# Patient Record
(demographics unavailable — no encounter records)

---

## 2024-11-29 NOTE — PHYSICAL EXAM
[Normal] : no acute distress, well nourished, well developed and well-appearing [No Respiratory Distress] : no respiratory distress  [No Accessory Muscle Use] : no accessory muscle use [No Focal Deficits] : no focal deficits [Normal Affect] : the affect was normal [Normal Insight/Judgement] : insight and judgment were intact [de-identified] : On 2L NC [73090 - High Complexity requires an extensive number of possible diagnoses and/or the management options, extensive complexity of the medical data (tests, etc.) to be reviewed, and a high risk of significant complications, morbidity, and/or mortality as w] : High Complexity

## 2024-11-29 NOTE — ASSESSMENT
[FreeTextEntry1] : 77y/o M PMHX of A-fib on Eliquis, CAD s/p CROW to OM1 (1/2024), HTN. HLD, ILD, former smoker, hx of bleeding ulcers, P/W SOB, worsening cough and blood-tinged sputum for 3 days. Patient admitted for acute hypoxic respiratory failure 2/2 PNA vs ILD, treated with Zosyn/azithromycin, switch to Ceftin for 3 more days. - s/p bipap qhs. BCx, and legionella negative, mycoplasma PCR and MRSA swab negative. Pulmonary recommended IV steroids initially, then switched to PO Prednisone 40mg daily, O2 support and bronchodilators. CTA neg for PE w/ extensive GGO. Also had hyponatremia, with Na improved. Poor po intake, encouraged to hydrate, s/p IVF, echo with preserved function. Patient improved respiratory status, however requiring 2L NC home O2 for discharge. Maintain O2 sat 88-93%. Steroid taper as follows per pulm: prednisone 30mg PO qd x 3 days then decrease to 20mg PO qd and continue.   Upon presentation, is doing well overall, currently denies cough, SOB, wheezing, hemoptysis, fever, chills, chest tightness, hoarseness, change in voice, or rhinorrhea.  With no known history of childhood asthma, eczema, frequent URI, history of PNA, or intubation. With a 40year, 1ppd smoking history, quit 5 years ago.  -All medications reviewed and reconciled with patient, use as prescribed. -Continue use of oxygen at all times with monitoring of oxygen saturations -Scheduled F/U with Pulmonologist, Cardiology, PCP in 1-2 weeks. With visiting RN and home PT services. -Advised to call office immediately with any change or worsening of symptoms -Advised to call 911 of go to ER immediately with any S/S of acute respiratory distress or worsening of symptoms -Reviewed F/U CT scan in 6-8 weeks, resolution of PNA

## 2024-11-29 NOTE — HISTORY OF PRESENT ILLNESS
[Home] : at home, [unfilled] , at the time of the visit. [Medical Office: ([facility])___] : at the medical office located in  [Verbal consent obtained from patient] : the patient, [unfilled] [[place]] : Post-hospitalization from [facility] [ILD (interstitial lung disease)] : ILD (interstitial lung disease) [Pneumonia] : Pneumonia [Respiratory failure with hypoxia] : Respiratory failure with hypoxia [Yes] : Yes [Admitted on: ___] : The patient was admitted on [unfilled] [Discharged on ___] : discharged on [unfilled] [Discharge Summary] : discharge summary [Pertinent Labs] : pertinent labs [Radiology Findings] : radiology findings [Discharge Med List] : discharge medication list [Med Reconciliation] : medication reconciliation has been completed [Patient Contacted By: ____] : and contacted by [unfilled] [Discharged with Oxygen] : Discharged with oxygen [Discharged with Nursing Homecare] : Discharged with nursing homecare [FreeTextEntry2] :  Patient presents via telehealth for posthospitalization visit.  Patient is a 77y/o M PMHX of A-fib on Eliquis, CAD s/p CROW to OM1 (1/2024), HTN. HLD, ILD, former smoker, hx of bleeding ulcers, P/W SOB, worsening cough and blood-tinged sputum for 3 days. Patient admitted for acute hypoxic respiratory failure 2/2 PNA vs ILD, treated with Zosyn/azithromycin, switch to Ceftin for 3 more days. - s/p bipap qhs. BCx, and legionella negative, mycoplasma PCR and MRSA swab negative. Pulmonary recommended IV steroids initially, then switched to PO Prednisone 40mg daily, O2 support and bronchodilators. CTA neg for PE w/ extensive GGO. Also had hyponatremia, with Na improved. Poor po intake, encouraged to hydrate, s/p IVF, echo with preserved function. Patient improved respiratory status, however requiring 2L NC home O2 for discharge. Maintain O2 sat 88-93%. Steroid taper as follows per pulm: prednisone 30mg PO qd x 3 days then decrease to 20mg PO qd and continue.   Upon presentation, is doing well overall, currently denies cough, SOB, wheezing, hemoptysis, fever, chills, chest tightness, hoarseness, change in voice, or rhinorrhea.  With no known history of childhood asthma, eczema, frequent URI, history of PNA, or intubation. With a 40year, 1ppd smoking history, quit 5 years ago.

## 2024-11-29 NOTE — PHYSICAL EXAM
[Normal] : no acute distress, well nourished, well developed and well-appearing [No Respiratory Distress] : no respiratory distress  [No Accessory Muscle Use] : no accessory muscle use [No Focal Deficits] : no focal deficits [Normal Affect] : the affect was normal [Normal Insight/Judgement] : insight and judgment were intact [de-identified] : On 2L NC [29234 - High Complexity requires an extensive number of possible diagnoses and/or the management options, extensive complexity of the medical data (tests, etc.) to be reviewed, and a high risk of significant complications, morbidity, and/or mortality as w] : High Complexity

## 2024-11-29 NOTE — HISTORY OF PRESENT ILLNESS
[Home] : at home, [unfilled] , at the time of the visit. [Medical Office: ([facility])___] : at the medical office located in  [Verbal consent obtained from patient] : the patient, [unfilled] [[place]] : Post-hospitalization from [facility] [ILD (interstitial lung disease)] : ILD (interstitial lung disease) [Pneumonia] : Pneumonia [Respiratory failure with hypoxia] : Respiratory failure with hypoxia [Yes] : Yes [Admitted on: ___] : The patient was admitted on [unfilled] [Discharged on ___] : discharged on [unfilled] [Discharge Summary] : discharge summary [Pertinent Labs] : pertinent labs [Radiology Findings] : radiology findings [Discharge Med List] : discharge medication list [Med Reconciliation] : medication reconciliation has been completed [Patient Contacted By: ____] : and contacted by [unfilled] [Discharged with Oxygen] : Discharged with oxygen [Discharged with Nursing Homecare] : Discharged with nursing homecare [FreeTextEntry2] :  Patient presents via telehealth for posthospitalization visit.  Patient is a 79y/o M PMHX of A-fib on Eliquis, CAD s/p CROW to OM1 (1/2024), HTN. HLD, ILD, former smoker, hx of bleeding ulcers, P/W SOB, worsening cough and blood-tinged sputum for 3 days. Patient admitted for acute hypoxic respiratory failure 2/2 PNA vs ILD, treated with Zosyn/azithromycin, switch to Ceftin for 3 more days. - s/p bipap qhs. BCx, and legionella negative, mycoplasma PCR and MRSA swab negative. Pulmonary recommended IV steroids initially, then switched to PO Prednisone 40mg daily, O2 support and bronchodilators. CTA neg for PE w/ extensive GGO. Also had hyponatremia, with Na improved. Poor po intake, encouraged to hydrate, s/p IVF, echo with preserved function. Patient improved respiratory status, however requiring 2L NC home O2 for discharge. Maintain O2 sat 88-93%. Steroid taper as follows per pulm: prednisone 30mg PO qd x 3 days then decrease to 20mg PO qd and continue.   Upon presentation, is doing well overall, currently denies cough, SOB, wheezing, hemoptysis, fever, chills, chest tightness, hoarseness, change in voice, or rhinorrhea.  With no known history of childhood asthma, eczema, frequent URI, history of PNA, or intubation. With a 40year, 1ppd smoking history, quit 5 years ago.

## 2024-11-29 NOTE — REVIEW OF SYSTEMS
[Fatigue] : fatigue [Dyspnea on Exertion] : dyspnea on exertion [Negative] : Heme/Lymph [FreeTextEntry9] : ambulate with walker

## 2024-12-20 NOTE — PHYSICAL EXAM
[de-identified] : blood left nostril [de-identified] : fatty growth neck [de-identified] : afib at 80

## 2024-12-20 NOTE — HISTORY OF PRESENT ILLNESS
[de-identified] : Annual Need flu shot but still sick and on steroid Colon several years ago  recent hosptial for bilateral pneumonia with resp failure and hypoxia still on  oxygen at [facility] and with exercise and travel some nosebleed some cough stopped smoking 2 years ago recent f/u ct and labs Psychitrist  recently with istory of depresson and anxiety on zoloft

## 2024-12-20 NOTE — HEALTH RISK ASSESSMENT
[[id number]] : 2 [Change in mental status noted] : No change in mental status noted [Language] : denies difficulty with language [Behavior] : denies difficulty with behavior [Learning/Retaining New Information] : denies difficulty learning/retaining new information [Handling Complex Tasks] : denies difficulty handling complex tasks [Reasoning] : denies difficulty with reasoning [Spatial Ability and Orientation] : denies difficulty with spatial ability and orientation [Reports changes in hearing] : Reports no changes in hearing [Reports changes in vision] : Reports no changes in vision [Reports changes in dental health] : Reports no changes in dental health

## 2024-12-20 NOTE — PLAN
[FreeTextEntry1] : Annual Need flu shot when off steroid  still recovering from pneuonia trial mucinex bacitracin ointment to bleeding nose ent for cautery prn anxiety persists on zoloft f/u pulm and cardiology prognosis guarded

## 2024-12-20 NOTE — HISTORY OF PRESENT ILLNESS
[de-identified] : Annual Need flu shot but still sick and on steroid Colon several years ago  recent hosptial for bilateral pneumonia with resp failure and hypoxia still on  oxygen at [facility] and with exercise and travel some nosebleed some cough stopped smoking 2 years ago recent f/u ct and labs Psychitrist  recently with istory of depresson and anxiety on zoloft

## 2024-12-20 NOTE — PHYSICAL EXAM
[de-identified] : blood left nostril [de-identified] : fatty growth neck [de-identified] : afib at 80

## 2024-12-20 NOTE — HEALTH RISK ASSESSMENT
[[id number]] : 2 [Change in mental status noted] : No change in mental status noted [Language] : denies difficulty with language [Behavior] : denies difficulty with behavior [Learning/Retaining New Information] : denies difficulty learning/retaining new information [Handling Complex Tasks] : denies difficulty handling complex tasks [Reasoning] : denies difficulty with reasoning [Spatial Ability and Orientation] : denies difficulty with spatial ability and orientation [Reports changes in hearing] : Reports no changes in hearing [Reports changes in vision] : Reports no changes in vision [Reports changes in dental health] : Reports no changes in dental health

## 2025-03-07 NOTE — HISTORY OF PRESENT ILLNESS
[FreeTextEntry1] : f/u [de-identified] : recovered from pneumonia ct lung 2 week ago severe copd some intersitial issue pulm want med for interstial lesion lung? need f/u 3 months See 2 pulmonoligists has upcoming appointment with second one to discuss health and this new treatment no cp still on prednisone 10 daily see Cardiolgy Dr Shrestha still moderatre chronic anxiety despite zolft 200

## 2025-03-07 NOTE — PHYSICAL EXAM
[Normal] : normal rate, regular rhythm, normal S1 and S2 and no murmur heard [de-identified] : slight wheeze

## 2025-03-07 NOTE — PLAN
[FreeTextEntry1] : Clinically stable  lung severe damage drugs for ILD must be discussed with  pulmonary f/.uy blood to asses liver and heart need f/u ct lung

## 2025-05-23 NOTE — HISTORY OF PRESENT ILLNESS
[FreeTextEntry1] : f/u [de-identified] : recent pneumonia coughing small amount of blood upcoming f/u ct of lung blood work yesterday Dr Arechiga No med for interstitial lung issue down to predniosne 5 mg daily off oxygen anxious but getting stronger and breathing improving

## 2025-05-23 NOTE — PLAN
[FreeTextEntry1] : bp good breathing good lungs clear await ct lung lipdis check by Dr Arechiga anxiety discussed

## 2025-05-23 NOTE — REVIEW OF SYSTEMS
[Shortness Of Breath] : no shortness of breath [Dyspnea on Exertion] : not dyspnea on exertion [Anxiety] : anxiety [Negative] : Genitourinary